# Patient Record
Sex: FEMALE | Race: WHITE | Employment: FULL TIME | URBAN - METROPOLITAN AREA
[De-identification: names, ages, dates, MRNs, and addresses within clinical notes are randomized per-mention and may not be internally consistent; named-entity substitution may affect disease eponyms.]

---

## 2017-06-05 ENCOUNTER — OFFICE VISIT (OUTPATIENT)
Dept: NEUROLOGY | Age: 27
End: 2017-06-05

## 2017-06-05 VITALS
WEIGHT: 129 LBS | OXYGEN SATURATION: 97 % | TEMPERATURE: 98.6 F | BODY MASS INDEX: 24.35 KG/M2 | SYSTOLIC BLOOD PRESSURE: 110 MMHG | HEART RATE: 86 BPM | RESPIRATION RATE: 15 BRPM | DIASTOLIC BLOOD PRESSURE: 64 MMHG | HEIGHT: 61 IN

## 2017-06-05 DIAGNOSIS — G70.00 MYASTHENIA GRAVIS IN REMISSION (HCC): Primary | ICD-10-CM

## 2017-06-05 RX ORDER — ALPRAZOLAM 0.5 MG/1
0.5 TABLET ORAL AS NEEDED
Refills: 1 | COMMUNITY
Start: 2017-04-21

## 2017-06-05 NOTE — MR AVS SNAPSHOT
Visit Information Date & Time Provider Department Dept. Phone Encounter #  
 6/5/2017 11:00 AM Tori Jett MD Neurology [place] [address] [phone] [id number] Follow-up Instructions Return in about 6 months (around 12/5/2017). Your Appointments 6/5/2017 11:00 AM  
New Patient with Tori Jett MD  
Neurology [place] [id number] ([facility]) Appt Note: np myasthenia gravis kna 2.17.17  
 [place] [address] [facility] [address] [facility] [phone] [phone]  
  
   
 [place] [address] [id number] [address] Upcoming Health Maintenance Date Due  
 HPV AGE 9Y-34Y (1 of 3 - Female 3 Dose Series) 8/31/2002 PAP AKA CERVICAL CYTOLOGY 9/3/2015 INFLUENZA AGE 9 TO ADULT 8/1/2017 DTaP/Tdap/Td series (2 - Td) 6/7/2023 Allergies as of 6/5/2017  Review Complete On: 6/5/2017 By: Tori Jett MD  
  
 Severity Noted Reaction Type Reactions Amoxicillin  11/09/2010    Other (comments) Makes skin peel off hands and feet Current Immunizations  Reviewed on 12/7/2011 Name Date Hepatitis B Vaccine 4/9/2002, 11/30/2001, 10/31/2001 Tdap 6/7/2013 Not reviewed this visit Vitals BP Pulse Temp Resp Height(growth percentile) Weight(growth percentile) 110/64 86 98.6 °F (37 °C) 15 5' 1\" (1.549 m) 129 lb (58.5 kg) SpO2 BMI OB Status Smoking Status 97% 24.37 kg/m2 Having regular periods Former Smoker BMI and BSA Data Body Mass Index Body Surface Area  
 24.37 kg/m 2 1.59 m 2 Preferred Pharmacy Pharmacy Name Phone [id number] - [place], [place] - [address] [address] [phone] Your Updated Medication List  
  
   
This list is accurate as of: 6/5/17 10:56 AM.  Always use your most recent med list.  
  
  
  
  
 norethindrone-ethinyl estradiol-iron 1.5 mg-30 mcg (21)/75 mg (7) Tab Commonly known as:  JUNEL FE 1.5/30 (28) Take 1 Tab by mouth daily. Follow-up Instructions Return in about 6 months (around 12/5/2017). Patient Instructions Information Regarding Testing If you have physican order for a test or a medication denied by your insurance company, this does not mean the test or medication is not appropriate for you as that is a medical decision, not a decision to be made by an insurance company representative or by an [facility] physician who has not interviewed and examined you. This is a decision to be made between you and your physician. The denial of services is a contractual matter between you and your insurance company, not an issue between your physician and the insurance company. If your test or medication is denied, you can take the following steps to help resolve the issue: 1. File a complaint with the [facility] of Insurance regarding your insurance company's denial of services ordered for you. You can do this either by calling them directly or by completing an on-line complaint form on the [employer]. This can be found at [email or web address] 2. Also file a formal complaint with your insurance company and ask to have the name of the person denying the service so that you may explore a legal option should you be harmed by this denial of service. Again, the fact the insurance company will not pay for the service does not mean it is not medically necessary and I would encourage you to follow through with the plan that was made with your physician 3. File a written complaint with your employer so your employer and benefit manager is aware of the poor coverage they are providing their employees. If you have medicare/medicaid, complain to your representative in the House and to your Dedrick Recio. PRESCRIPTION REFILL POLICY [facility] Statement to Patients April 1, 2014 In an effort to ensure the large volume of patient prescription refills is processed in the most efficient and expeditious manner, we are asking our patients to assist us by calling your Pharmacy for all prescription refills, this will include also your  Mail Order Pharmacy. The pharmacy will contact our office electronically to continue the refill process. Please do not wait until the last minute to call your pharmacy. We need at least 48 hours (2days) to fill prescriptions. We also encourage you to call your pharmacy before going to  your prescription to make sure it is ready. With regard to controlled substance prescription refill requests (narcotic refills) that need to be picked up at our office, we ask your cooperation by providing us with at least 72 hours (3days) notice that you will need a refill. We will not refill narcotic prescription refill requests after 4:00pm on any weekday, Monday through Thursday, or after 2:00pm on Fridays, or on the weekends. We encourage everyone to explore another way of getting your prescription refill request processed using Vusay, our patient web portal through our electronic medical record system. Vusay is an efficient and effective way to communicate your medication request directly to the office and  downloadable as an unique on your smart phone . Vusay also features a review functionality that allows you to view your medication list as well as leave messages for your physician. Are you ready to get connected? If so please review the attatched instructions or speak to any of our staff to get you set up right away! Thank you so much for your cooperation. Should you have any questions please contact our Practice Administrator. The Physicians and Staff,  [facility] If we have ordered testing for you, we do not call patients with results and we do not give test results over the phone.   We schedule follow up appointments so that your results can be discussed in person and any questions you have regarding them may be addressed. If something of concern is revealed on your test, we will call you for a sooner follow up appointment. Additionally, results may be found by using the My Chart feature and one of our patient service representatives at the  can give you instructions on how to access this feature of our electronic medical record system. [place] [address] What is a living will? A living will is a legal form you use to write down the kind of care you want at the end of your life. It is used by the health professionals who will treat you if you aren't able to decide for yourself. If you put your wishes in writing, your loved ones and others will know what kind of care you want. They won't need to guess. This can ease your mind and be helpful to others. A living will is not the same as an estate or property will. An estate will explains what you want to happen with your money and property after you die. Is a living will a legal document? A living will is a legal document. Each state has its own laws about living tavares. If you move to another state, make sure that your living will is legal in the state where you now live. Or you might use a universal form that has been approved by many states. This kind of form can sometimes be completed and stored online. Your electronic copy will then be available wherever you have a connection to the Internet. In most cases, doctors will respect your wishes even if you have a form from a different state. · You don't need an  to complete a living will. But legal advice can be helpful if your state's laws are unclear, your health history is complicated, or your family can't agree on what should be in your living will. · You can change your living will at any time.  Some people find that their wishes about end-of-life care change as their health changes. · In addition to making a living will, think about completing a medical power of  form. This form lets you name the person you want to make end-of-life treatment decisions for you (your \"health care agent\") if you're not able to. Many hospitals and nursing homes will give you the forms you need to complete a living will and a medical power of . · Your living will is used only if you can't make or communicate decisions for yourself anymore. If you become able to make decisions again, you can accept or refuse any treatment, no matter what you wrote in your living will. · Your state may offer an online registry. This is a place where you can store your living will online so the doctors and nurses who need to treat you can find it right away. What should you think about when creating a living will? Talk about your end-of-life wishes with your family members and your doctor. Let them know what you want. That way the people making decisions for you won't be surprised by your choices. Think about these questions as you make your living will: · Do you know enough about life support methods that might be used? If not, talk to your doctor so you know what might be done if you can't breathe on your own, your heart stops, or you're unable to swallow. · What things would you still want to be able to do after you receive life-support methods? Would you want to be able to walk? To speak? To eat on your own? To live without the help of machines? · If you have a choice, where do you want to be cared for? In your home? At a hospital or nursing home? · Do you want certain Sikhism practices performed if you become very ill? · If you have a choice at the end of your life, where would you prefer to die? At home? In a hospital or nursing home? Somewhere else? · Would you prefer to be buried or cremated? · Do you want your organs to be donated after you die? What should you do with your living will? · Make sure that your family members and your health care agent have copies of your living will. · Give your doctor a copy of your living will to keep in your medical record. If you have more than one doctor, make sure that each one has a copy. · You may want to put a copy of your living will where it can be easily found. Where can you learn more? Go to http://israel-lui.info/. Enter Z152 in the search box to learn more about \"Learning About Living Perrochasity. \" Current as of: February 24, 2016 Content Version: 11.2 © 7445-0867 [employer]. Care instructions adapted under license by [employer] (which disclaims liability or warranty for this information). If you have questions about a medical condition or this instruction, always ask your healthcare professional. [address] any warranty or liability for your use of this information. Introducing [facility]! Janine Cleverly introduces [employer]Culture patient portal. Now you can access parts of your medical record, email your doctor's office, and request medication refills online. 1. In your internet browser, go to https://[employer]. [employer] 2. Click on the First Time User? Click Here link in the Sign In box. You will see the New Member Sign Up page. 3. Enter your SafetyCulture Access Code exactly as it appears below. You will not need to use this code after youve completed the sign-up process. If you do not sign up before the expiration date, you must request a new code. · SafetyCulture Access Code: [email or web address] Expires: 9/3/2017 10:54 AM 
 
4. Enter the last four digits of your Social Security Number (xxxx) and Date of Birth (mm/dd/yyyy) as indicated and click Submit. You will be taken to the next sign-up page. 5. Create a SafetyCulture ID.  This will be your SafetyCulture login ID and cannot be changed, so think of one that is secure and easy to remember. 6. Create a [facility] password. You can change your password at any time. 7. Enter your Password Reset Question and Answer. This can be used at a later time if you forget your password. 8. Enter your e-mail address. You will receive e-mail notification when new information is available in [address] 9. Click Sign Up. You can now view and download portions of your medical record. 10. Click the Download Summary menu link to download a portable copy of your medical information. If you have questions, please visit the Frequently Asked Questions section of the [facility] website. Remember, [facility] is NOT to be used for urgent needs. For medical emergencies, dial 911. Now available from your iPhone and Android! Please provide this summary of care documentation to your next provider. Your primary care clinician is listed as Enriqueta Merrill. If you have any questions after today's visit, please call [phone].

## 2017-06-05 NOTE — PROGRESS NOTES
[address] [facility]   [place] [address]   [place], [address] [place] [address] Drive   [phone] [place]   [phone] Fax             Referring: Regina Donnelly MD      No chief complaint on file. Pleasant 78-year-old right-handed woman who presents today for evaluation of what she calls myasthenia gravis checkup. She tells me she was diagnosed with myasthenia gravis at the age of 14. She says she at that time was having symptoms of buckling knees and she was unable to hold her head upright. She was unable to close her eyes. She had difficulty swallowing and shortness of breath. She said her symptoms went on and were progressive for about a year until she finally was diagnosed. She was diagnosed via blood tests and EMG. [place] [facility] [place] Blanca Langley and then by Dr. Sebastián Torres. She had thymectomy performed in 2007. She was on prednisone for about 3 years and she was on Mestinon as well. She also had plasmapheresis and IVIG. Her last use of IVIG or plasmapheresis was about 8 years ago when she had those and tandem having the pheresis first and then the IVIG. She was tried on CellCept which gave her a rash. She notes that she has been off of her Mestinon for about 3 years now. She says that she was taking it and did not feel any better and she discussed it with her neurologist at the time and he felt that she was likely in remission so the medicine was discontinued. She has been doing well ever since. She has not had any shortness of breath difficulty with speech language or swallowing. She has not had any weakness. No double vision. No ptosis. No falls. Doing very well without symptom. She had been following with  until he moved his practice to [place]. She is wishing to establish care with a neurologist in case something happens. Again no active symptoms.       Past Medical History:   Diagnosis Date    AR (allergic rhinitis) 7/2/2010  Fatigue     Menometrorrhagia 7/2/2010    Muscle weakness     Myasthenia gravis ([facility] [address] 7/2/2010    Recurrent acute otitis media 7/2/2010    Vertigo        Past Surgical History:   Procedure Laterality Date    HX HEENT      age 6-Tympanostomy tubes placement    WI THYMECTOMY,TRANSCERVICAL  12/08    Thymectomy       Current Outpatient Prescriptions   Medication Sig Dispense Refill    norethindrone-ethinyl estradiol-iron (JUNEL FE 1.5/30, 28,) 1.5-30 mg-mcg tablet Take 1 Tab by mouth daily. 3 Packet 4        Allergies   Allergen Reactions    Amoxicillin Other (comments)     Makes skin peel off hands and feet       Social History   Substance Use Topics    Smoking status: Former Smoker     Types: Cigarettes     Quit date: 2/1/2011    Smokeless tobacco: Never Used    Alcohol use 1.2 - 2.4 oz/week     2 - 4 Glasses of wine per week    she never really smoked regularly. She is more of a social smoker. She stopped that in January 2017. She currently works the  at [employer] over at [employer]. Family History   Problem Relation Age of Onset    Other Mother      [place] palsy    Hypertension Father     No Known Problems Sister     Cancer Maternal Aunt     MS Maternal Aunt        Review of Systems  Pertinent positives and negatives as noted above otherwise remainder of comprehensive review is negative    Examination  Visit Vitals    /64    Pulse 86    Temp 98.6 °F (37 °C)    Resp 15    Ht 5' 1\" (1.549 m)    Wt 58.5 kg (129 lb)    SpO2 97%    BMI 24.37 kg/m2     Pleasant, well appearing. No icterus. Oropharynx clear. Supple neck without bruit. Heart regular. No murmur. No edema. Neurologically, she is awake, alert, and oriented with normal speech and language. Her cognition is normal.    Intact cranial nerves 2-12. She has no invokeable diplopia. No nystagmus. Visual fields full to confrontation. Disk margins are flat bilaterally.   She has normal bulk and tone. She has no abnormal movement. She has no pronation or drift. She generates full strength in the upper and lower extremities to direct confrontational testing. Reflexes are symmetrical in the upper and lower extremities bilaterally. Her toes are down bilaterally. No Michelle. Finger nose finger and rapid alternating movements are normal.  Steady gait. No sensory deficit to primary modalities. Impression/Plan  Very pleasant young woman with history of myasthenia gravis now in remission. We discussed this. From my standpoint no need to introduce any medications. Certainly we will be available should she have any issue and we discussed that. For right now we will go ahead and set a 6 month follow-up appointment but certainly if she has issues she will call us. We did discuss warning signs. If she is doing well at 6 months then we will likely relegate her to yearly follow-up. This note was created using voice recognition software. Despite editing, there may be syntax errors. This note will not be viewable in [address]

## 2017-06-05 NOTE — PROGRESS NOTES
When nurse went back in to room to give patient her AVS, patient stated she forget to mention she takes alpra

## 2017-06-05 NOTE — PROGRESS NOTES
New patient presenting with Myasthenia Gravis. Diagnosed 10 years ago. No acute symptoms.  Seeking new neurologist.

## 2017-06-05 NOTE — PATIENT INSTRUCTIONS
Information Regarding Testing     If you have physican order for a test or a medication denied by your insurance company, this does not mean the test or medication is not appropriate for you as that is a medical decision, not a decision to be made by an insurance company representative or by an [facility] physician who has not interviewed and examined you. This is a decision to be made between you and your physician. The denial of services is a contractual matter between you and your insurance company, not an issue between your physician and the insurance company. If your test or medication is denied, you can take the following steps to help resolve the issue:    1. File a complaint with the [facility] regarding your insurance company's denial of services ordered for you. You can do this either by calling them directly or by completing an on-line complaint form on the [employer]. This can be found at www.Entigo    2. Also file a formal complaint with your insurance company and ask to have the name of the person denying the service so that you may explore a legal option should you be harmed by this denial of service. Again, the fact the insurance company will not pay for the service does not mean it is not medically necessary and I would encourage you to follow through with the plan that was made with your physician    3. File a written complaint with your employer so your employer and benefit manager is aware of the poor coverage they are providing their employees. If you have medicare/medicaid, complain to your representative in the House and to your Dedrick Recio.     [facility]   Statement to Patients  April 1, 2014      In an effort to ensure the large volume of patient prescription refills is processed in the most efficient and expeditious manner, we are asking our patients to assist us by calling your Pharmacy for all prescription refills, this will include also your  Mail Order Pharmacy. The pharmacy will contact our office electronically to continue the refill process. Please do not wait until the last minute to call your pharmacy. We need at least 48 hours (2days) to fill prescriptions. We also encourage you to call your pharmacy before going to  your prescription to make sure it is ready. With regard to controlled substance prescription refill requests (narcotic refills) that need to be picked up at our office, we ask your cooperation by providing us with at least 72 hours (3days) notice that you will need a refill. We will not refill narcotic prescription refill requests after 4:00pm on any weekday, Monday through Thursday, or after 2:00pm on Fridays, or on the weekends. We encourage everyone to explore another way of getting your prescription refill request processed using GAMEVIL, our patient web portal through our electronic medical record system. GAMEVIL is an efficient and effective way to communicate your medication request directly to the office and  downloadable as an unique on your smart phone . GAMEVIL also features a review functionality that allows you to view your medication list as well as leave messages for your physician. Are you ready to get connected? If so please review the attatched instructions or speak to any of our staff to get you set up right away! Thank you so much for your cooperation. Should you have any questions please contact our Practice Administrator. The Physicians and Staff,  [facility]       If we have ordered testing for you, we do not call patients with results and we do not give test results over the phone. We schedule follow up appointments so that your results can be discussed in person and any questions you have regarding them may be addressed.   If something of concern is revealed on your test, we will call you for a sooner follow up appointment. Additionally, results may be found by using the My Chart feature and one of our patient service representatives at the  can give you instructions on how to access this feature of our electronic medical record system. Learning About Living Brigida  What is a living will? A living will is a legal form you use to write down the kind of care you want at the end of your life. It is used by the health professionals who will treat you if you aren't able to decide for yourself. If you put your wishes in writing, your loved ones and others will know what kind of care you want. They won't need to guess. This can ease your mind and be helpful to others. A living will is not the same as an estate or property will. An estate will explains what you want to happen with your money and property after you die. Is a living will a legal document? A living will is a legal document. Each state has its own laws about living tavares. If you move to another state, make sure that your living will is legal in the state where you now live. Or you might use a universal form that has been approved by many states. This kind of form can sometimes be completed and stored online. Your electronic copy will then be available wherever you have a connection to the Internet. In most cases, doctors will respect your wishes even if you have a form from a different state. · You don't need an  to complete a living will. But legal advice can be helpful if your state's laws are unclear, your health history is complicated, or your family can't agree on what should be in your living will. · You can change your living will at any time. Some people find that their wishes about end-of-life care change as their health changes. · In addition to making a living will, think about completing a medical power of  form.  This form lets you name the person you want to make end-of-life treatment decisions for you (your \"health care agent\") if you're not able to. Many hospitals and nursing homes will give you the forms you need to complete a living will and a medical power of . · Your living will is used only if you can't make or communicate decisions for yourself anymore. If you become able to make decisions again, you can accept or refuse any treatment, no matter what you wrote in your living will. · Your state may offer an online registry. This is a place where you can store your living will online so the doctors and nurses who need to treat you can find it right away. What should you think about when creating a living will? Talk about your end-of-life wishes with your family members and your doctor. Let them know what you want. That way the people making decisions for you won't be surprised by your choices. Think about these questions as you make your living will:  · Do you know enough about life support methods that might be used? If not, talk to your doctor so you know what might be done if you can't breathe on your own, your heart stops, or you're unable to swallow. · What things would you still want to be able to do after you receive life-support methods? Would you want to be able to walk? To speak? To eat on your own? To live without the help of machines? · If you have a choice, where do you want to be cared for? In your home? At a hospital or nursing home? · Do you want certain Adventism practices performed if you become very ill? · If you have a choice at the end of your life, where would you prefer to die? At home? In a hospital or nursing home? Somewhere else? · Would you prefer to be buried or cremated? · Do you want your organs to be donated after you die? What should you do with your living will? · Make sure that your family members and your health care agent have copies of your living will. · Give your doctor a copy of your living will to keep in your medical record.  If you have more than one doctor, make sure that each one has a copy. · You may want to put a copy of your living will where it can be easily found. Where can you learn more? Go to http://israel-lui.info/. Enter W096 in the search box to learn more about \"Learning About Living Perroy. \"  Current as of: February 24, 2016  Content Version: 11.2  © 4071-6910 [employer]. Care instructions adapted under license by [employer] (which disclaims liability or warranty for this information). If you have questions about a medical condition or this instruction, always ask your healthcare professional. [address] any warranty or liability for your use of this information.

## 2017-06-05 NOTE — PROGRESS NOTES
When nurse went back in to room to give patient her AVS, patient stated she forgot to mention she takes xanax PRN for vertigo.  Medication added to patient list.

## 2018-03-30 ENCOUNTER — OFFICE VISIT (OUTPATIENT)
Dept: NEUROLOGY | Age: 28
End: 2018-03-30

## 2018-03-30 VITALS
DIASTOLIC BLOOD PRESSURE: 62 MMHG | HEIGHT: 61 IN | BODY MASS INDEX: 24.73 KG/M2 | OXYGEN SATURATION: 98 % | WEIGHT: 131 LBS | SYSTOLIC BLOOD PRESSURE: 110 MMHG | HEART RATE: 73 BPM

## 2018-03-30 DIAGNOSIS — G70.00 MYASTHENIA GRAVIS IN REMISSION (HCC): Primary | ICD-10-CM

## 2018-03-30 DIAGNOSIS — G47.33 OBSTRUCTIVE SLEEP APNEA SYNDROME: ICD-10-CM

## 2018-03-30 NOTE — MR AVS SNAPSHOT
[address] [facility], 
[address], [address] [facility] 
[phone] Patient: Diandra Hidalgo MRN: [id number] Kenyatta Iraheta Visit Information Date & Time Provider Department Dept. Phone Encounter #  
 3/30/2018  8:40 AM Lynnette Rahman MD [facility] at [facility] [phone] [id number] Follow-up Instructions Return in about 1 year (around 3/30/2019) for MYASTHENIA. Upcoming Health Maintenance Date Due  
 HPV AGE 9Y-34Y (1 of 3 - Female 3 Dose Series) 8/31/2002 PAP AKA CERVICAL CYTOLOGY 9/3/2015 Influenza Age 5 to Adult 8/1/2017 DTaP/Tdap/Td series (2 - Td) 6/7/2023 Allergies as of 3/30/2018  Review Complete On: 3/30/2018 By: Lynnette Rahman MD  
  
 Severity Noted Reaction Type Reactions Amoxicillin  11/09/2010    Other (comments) Makes skin peel off hands and feet Current Immunizations  Reviewed on 12/7/2011 Name Date Hepatitis B Vaccine 4/9/2002, 11/30/2001, 10/31/2001 Tdap 6/7/2013 Not reviewed this visit You Were Diagnosed With   
  
 Codes Comments Myasthenia gravis in remission ([facility]    -  Primary ICD-10-CM: G70.00 ICD-9-CM: 358.00 Obstructive sleep apnea syndrome     ICD-10-CM: G47.33 
ICD-9-CM: 327.23 Vitals BP Pulse Height(growth percentile) Weight(growth percentile) SpO2 BMI  
 110/62 73 5' 1\" (1.549 m) 131 lb (59.4 kg) 98% 24.75 kg/m2 OB Status Smoking Status Having regular periods Former Smoker Vitals History BMI and BSA Data Body Mass Index Body Surface Area 24.75 kg/m 2 1.6 m 2 Preferred Pharmacy Pharmacy Name Phone [id number] - [place], [place] - [address] [address] [phone] Your Updated Medication List  
  
   
This list is accurate as of 3/30/18  9:17 AM.  Always use your most recent med list.  
  
  
  
  
 ALPRAZolam 0.5 mg tablet Commonly known as:  XANAX  
0.5 mg as needed. norethindrone-ethinyl estradiol-iron 1.5 mg-30 mcg (21)/75 mg (7) Tab Commonly known as:  JUNEL FE 1.5/30 (28) Take 1 Tab by mouth daily. We Performed the Following REFERRAL TO PULMONARY DISEASE [MLF12 Custom] Comments: ADAIR Goldman MD 
[facility] Follow-up Instructions Return in about 1 year (around 3/30/2019) for MYASTHENIA. Referral Information Referral ID Referred By Referred To  
  
 [id number] Seymour Peaemily Not Available Visits Status Start Date End Date 1 New Request 3/30/18 3/30/19 If your referral has a status of pending review or denied, additional information will be sent to support the outcome of this decision. Patient Instructions PRESCRIPTION REFILL POLICY [facility] Statement to Patients April 1, 2014 In an effort to ensure the large volume of patient prescription refills is processed in the most efficient and expeditious manner, we are asking our patients to assist us by calling your Pharmacy for all prescription refills, this will include also your  Mail Order Pharmacy. The pharmacy will contact our office electronically to continue the refill process. Please do not wait until the last minute to call your pharmacy. We need at least 48 hours (2days) to fill prescriptions. We also encourage you to call your pharmacy before going to  your prescription to make sure it is ready. With regard to controlled substance prescription refill requests (narcotic refills) that need to be picked up at our office, we ask your cooperation by providing us with at least 72 hours (3days) notice that you will need a refill. We will not refill narcotic prescription refill requests after 4:00pm on any weekday, Monday through Thursday, or after 2:00pm on Fridays, or on the weekends. We encourage everyone to explore another way of getting your prescription refill request processed using ServerEngines, our patient web portal through our electronic medical record system. [employer]ines is an efficient and effective way to communicate your medication request directly to the office and  downloadable as an unique on your smart phone . [employer] also features a review functionality that allows you to view your medication list as well as leave messages for your physician. Are you ready to get connected? If so please review the attatched instructions or speak to any of our staff to get you set up right away! Thank you so much for your cooperation. Should you have any questions please contact our Practice Administrator. The Physicians and Staff,  [facility] Please be advised there is a $25 fee for all paperwork to be completed from our  providers. This is to be paid by the patient prior to picking up the completed forms. A Healthy Lifestyle: Care Instructions Your Care Instructions A healthy lifestyle can help you feel good, stay at a healthy weight, and have plenty of energy for both work and play. A healthy lifestyle is something you can share with your whole family. A healthy lifestyle also can lower your risk for serious health problems, such as high blood pressure, heart disease, and diabetes. You can follow a few steps listed below to improve your health and the health of your family. Follow-up care is a key part of your treatment and safety. Be sure to make and go to all appointments, and call your doctor if you are having problems. It's also a good idea to know your test results and keep a list of the medicines you take. How can you care for yourself at home? · Do not eat too much sugar, fat, or fast foods. You can still have dessert and treats now and then. The goal is moderation. · Start small to improve your eating habits.  Pay attention to portion sizes, drink less juice and soda pop, and eat more fruits and vegetables. ¨ Eat a healthy amount of food. A 3-ounce serving of meat, for example, is about the size of a deck of cards. Fill the rest of your plate with vegetables and whole grains. ¨ Limit the amount of soda and sports drinks you have every day. Drink more water when you are thirsty. ¨ Eat at least 5 servings of fruits and vegetables every day. It may seem like a lot, but it is not hard to reach this goal. A serving or helping is 1 piece of fruit, 1 cup of vegetables, or 2 cups of leafy, raw vegetables. Have an apple or some carrot sticks as an afternoon snack instead of a candy bar. Try to have fruits and/or vegetables at every meal. 
· Make exercise part of your daily routine. You may want to start with simple activities, such as walking, bicycling, or slow swimming. Try to be active 30 to 60 minutes every day. You do not need to do all 30 to 60 minutes all at once. For example, you can exercise 3 times a day for 10 or 20 minutes. Moderate exercise is safe for most people, but it is always a good idea to talk to your doctor before starting an exercise program. 
· Keep moving. Shira Martha the lawn, work in the garden, or [employer]. Take the stairs instead of the elevator at work. · If you smoke, quit. People who smoke have an increased risk for heart attack, stroke, cancer, and other lung illnesses. Quitting is hard, but there are ways to boost your chance of quitting tobacco for good. ¨ Use nicotine gum, patches, or lozenges. ¨ Ask your doctor about stop-smoking programs and medicines. ¨ Keep trying. In addition to reducing your risk of diseases in the future, you will notice some benefits soon after you stop using tobacco. If you have shortness of breath or asthma symptoms, they will likely get better within a few weeks after you quit. · Limit how much alcohol you drink.  Moderate amounts of alcohol (up to 2 drinks a day for men, 1 drink a day for women) are okay. But drinking too much can lead to liver problems, high blood pressure, and other health problems. Family health If you have a family, there are many things you can do together to improve your health. · Eat meals together as a family as often as possible. · Eat healthy foods. This includes fruits, vegetables, lean meats and dairy, and whole grains. · Include your family in your fitness plan. Most people think of activities such as jogging or tennis as the way to fitness, but there are many ways you and your family can be more active. Anything that makes you breathe hard and gets your heart pumping is exercise. Here are some tips: 
¨ Walk to do errands or to take your child to school or the bus. ¨ Go for a family bike ride after dinner instead of watching TV. Where can you learn more? Go to http://israel-lui.info/. Enter R522 in the search box to learn more about \"A Healthy Lifestyle: Care Instructions. \" Current as of: May 12, 2017 Content Version: 11.4 © 8591-8991 [employer]. Care instructions adapted under license by [employer] (which disclaims liability or warranty for this information). If you have questions about a medical condition or this instruction, always ask your healthcare professional. [address] any warranty or liability for your use of this information. Introducing [facility]! Tali Jarquin introduces Swift Identity patient portal. Now you can access parts of your medical record, email your doctor's office, and request medication refills online. 1. In your internet browser, go to [email or web address] 2. Click on the First Time User? Click Here link in the Sign In box. You will see the New Member Sign Up page. 3. Enter your Swift Identity Access Code exactly as it appears below.  You will not need to use this code after youve completed the sign-up process. If you do not sign up before the expiration date, you must request a new code. · cafegive Access Code: [email or web address] Expires: 6/28/2018  8:38 AM 
 
4. Enter the last four digits of your Social Security Number (xxxx) and Date of Birth (mm/dd/yyyy) as indicated and click Submit. You will be taken to the next sign-up page. 5. Create a cafegive ID. This will be your cafegive login ID and cannot be changed, so think of one that is secure and easy to remember. 6. Create a cafegive password. You can change your password at any time. 7. Enter your Password Reset Question and Answer. This can be used at a later time if you forget your password. 8. Enter your e-mail address. You will receive e-mail notification when new information is available in [address]. 9. Click Sign Up. You can now view and download portions of your medical record. 10. Click the Download Summary menu link to download a portable copy of your medical information. If you have questions, please visit the Frequently Asked Questions section of the cafegive website. Remember, cafegive is NOT to be used for urgent needs. For medical emergencies, dial 911. Now available from your iPhone and Android! Please provide this summary of care documentation to your next provider. Your primary care clinician is listed as Maritza Bullard. If you have any questions after today's visit, please call [phone].

## 2018-03-30 NOTE — PROGRESS NOTES
Name: Raysa Reed    Chief Complaint: Myasthenia gravis    Returns for folow up. Seen by Dr. Susan Castro in June of last year. Diagnosed at age 14 with myasthenia. Presenting symptoms were difficulty swallowing and neck extensor weakness. [place] [facility] [place] Dr. Jesse Kwan and finally Dr. Nohemi Valdez. She was diagnosed with granuloma annulare    Medications  Current Outpatient Prescriptions   Medication Sig    ALPRAZolam (XANAX) 0.5 mg tablet 0.5 mg as needed.  norethindrone-ethinyl estradiol-iron (JUNEL FE 1.5/30, 28,) 1.5-30 mg-mcg tablet Take 1 Tab by mouth daily. No current facility-administered medications for this visit. Medical History  Past Medical History:   Diagnosis Date    AR (allergic rhinitis) 7/2/2010    Fatigue     Menometrorrhagia 7/2/2010    Muscle weakness     Myasthenia gravis ([address] 7/2/2010    Recurrent acute otitis media 7/2/2010    Vertigo      Review of Systems   Constitutional: Negative for chills and fever. HENT: Negative for ear pain. Eyes: Negative for pain and discharge. Respiratory: Negative for cough and hemoptysis. Cardiovascular: Negative for chest pain and claudication. Gastrointestinal: Negative for constipation and diarrhea. Genitourinary: Negative for flank pain and hematuria. Musculoskeletal: Negative for back pain and myalgias. Skin: Negative for itching and rash. Neurological: Negative for headaches. Endo/Heme/Allergies: Negative for environmental allergies. Does not bruise/bleed easily. Psychiatric/Behavioral: Negative for depression and hallucinations. Exam:    Visit Vitals    /62    Pulse 73    Ht 5' 1\" (1.549 m)    Wt 131 lb (59.4 kg)    SpO2 98%    BMI 24.75 kg/m2      General: Well developed, well nourished.  Patient in no apparent distress   Head: Normocephalic, atraumatic, anicteric sclera   Neck Normal ROM, No thyromegally   Lungs:  Clear to auscultation bilaterally, No wheezes or rubs Cardiac: Regular rate and rhythm with no murmurs. Abd: Bowel sounds were audible. No tenderness on palpation   Ext: No pedal edema   Skin: Supple no granuloma annulare shins and abdomen     NeurologicExam:  Mental Status: Alert and oriented to person place and time   Speech: Fluent no aphasia or dysarthria. Cranial Nerves:  II - XII Intact maybe weak left orbicularis   Motor:  Full and symmetric strength of upper and lower proximal and distal muscles. Normal bulk and tone. Reflexes:   Deep tendon reflexes 2+/4 and symmetric. Sensory:   Symmetric and intact with no perceived deficits modalities involving small or large fibers. Gait:  Gait is balanced and fluid with normal arm swing. Tremor:   No tremor noted. Cerebellar:  Coordination intact. Neurovascular: No carotid bruits.  No JVD           Imaging    Lab Review    Lab Results   Component Value Date/Time    WBC 6.9 05/17/2016 03:36 PM    HCT 41.4 05/17/2016 03:36 PM    HGB 14.0 05/17/2016 03:36 PM    PLATELET 709 43/09/6298 03:36 PM       Lab Results   Component Value Date/Time    Sodium 138 05/17/2016 03:36 PM    Potassium 3.8 05/17/2016 03:36 PM    Chloride 100 05/17/2016 03:36 PM    CO2 21 05/17/2016 03:36 PM    Glucose 96 05/17/2016 03:36 PM    BUN 8 05/17/2016 03:36 PM    Creatinine 0.61 05/17/2016 03:36 PM    Calcium 9.2 05/17/2016 03:36 PM

## 2018-03-30 NOTE — PATIENT INSTRUCTIONS
[facility]   Statement to Patients  April 1, 2014      In an effort to ensure the large volume of patient prescription refills is processed in the most efficient and expeditious manner, we are asking our patients to assist us by calling your Pharmacy for all prescription refills, this will include also your  Mail Order Pharmacy. The pharmacy will contact our office electronically to continue the refill process. Please do not wait until the last minute to call your pharmacy. We need at least 48 hours (2days) to fill prescriptions. We also encourage you to call your pharmacy before going to  your prescription to make sure it is ready. With regard to controlled substance prescription refill requests (narcotic refills) that need to be picked up at our office, we ask your cooperation by providing us with at least 72 hours (3days) notice that you will need a refill. We will not refill narcotic prescription refill requests after 4:00pm on any weekday, Monday through Thursday, or after 2:00pm on Fridays, or on the weekends. We encourage everyone to explore another way of getting your prescription refill request processed using Nautal, our patient web portal through our electronic medical record system. Nautal is an efficient and effective way to communicate your medication request directly to the office and  downloadable as an unique on your smart phone . Nautal also features a review functionality that allows you to view your medication list as well as leave messages for your physician. Are you ready to get connected? If so please review the attatched instructions or speak to any of our staff to get you set up right away! Thank you so much for your cooperation. Should you have any questions please contact our Practice Administrator.     The Physicians and Staff,  [facility]     Please be advised there is a $25 fee for all paperwork to be completed from our  providers. This is to be paid by the patient prior to picking up the completed forms. A Healthy Lifestyle: Care Instructions  Your Care Instructions    A healthy lifestyle can help you feel good, stay at a healthy weight, and have plenty of energy for both work and play. A healthy lifestyle is something you can share with your whole family. A healthy lifestyle also can lower your risk for serious health problems, such as high blood pressure, heart disease, and diabetes. You can follow a few steps listed below to improve your health and the health of your family. Follow-up care is a key part of your treatment and safety. Be sure to make and go to all appointments, and call your doctor if you are having problems. It's also a good idea to know your test results and keep a list of the medicines you take. How can you care for yourself at home? · Do not eat too much sugar, fat, or fast foods. You can still have dessert and treats now and then. The goal is moderation. · Start small to improve your eating habits. Pay attention to portion sizes, drink less juice and soda pop, and eat more fruits and vegetables. ¨ Eat a healthy amount of food. A 3-ounce serving of meat, for example, is about the size of a deck of cards. Fill the rest of your plate with vegetables and whole grains. ¨ Limit the amount of soda and sports drinks you have every day. Drink more water when you are thirsty. ¨ Eat at least 5 servings of fruits and vegetables every day. It may seem like a lot, but it is not hard to reach this goal. A serving or helping is 1 piece of fruit, 1 cup of vegetables, or 2 cups of leafy, raw vegetables. Have an apple or some carrot sticks as an afternoon snack instead of a candy bar. Try to have fruits and/or vegetables at every meal.  · Make exercise part of your daily routine. You may want to start with simple activities, such as walking, bicycling, or slow swimming.  Try to be active 30 to 60 minutes every day. You do not need to do all 30 to 60 minutes all at once. For example, you can exercise 3 times a day for 10 or 20 minutes. Moderate exercise is safe for most people, but it is always a good idea to talk to your doctor before starting an exercise program.  · Keep moving. [place] the lawn, work in the garden, or [employer]. Take the stairs instead of the elevator at work. · If you smoke, quit. People who smoke have an increased risk for heart attack, stroke, cancer, and other lung illnesses. Quitting is hard, but there are ways to boost your chance of quitting tobacco for good. ¨ Use nicotine gum, patches, or lozenges. ¨ Ask your doctor about stop-smoking programs and medicines. ¨ Keep trying. In addition to reducing your risk of diseases in the future, you will notice some benefits soon after you stop using tobacco. If you have shortness of breath or asthma symptoms, they will likely get better within a few weeks after you quit. · Limit how much alcohol you drink. Moderate amounts of alcohol (up to 2 drinks a day for men, 1 drink a day for women) are okay. But drinking too much can lead to liver problems, high blood pressure, and other health problems. Family health  If you have a family, there are many things you can do together to improve your health. · Eat meals together as a family as often as possible. · Eat healthy foods. This includes fruits, vegetables, lean meats and dairy, and whole grains. · Include your family in your fitness plan. Most people think of activities such as jogging or tennis as the way to fitness, but there are many ways you and your family can be more active. Anything that makes you breathe hard and gets your heart pumping is exercise. Here are some tips:  ¨ Walk to do errands or to take your child to school or the bus. ¨ Go for a family bike ride after dinner instead of watching TV. Where can you learn more?   Go to http://israel-lui.info/. Enter N009 in the search box to learn more about \"A Healthy Lifestyle: Care Instructions. \"  Current as of: May 12, 2017  Content Version: 11.4  © 0762-0155 [employer], [employer]. Care instructions adapted under license by [employer] (which disclaims liability or warranty for this information). If you have questions about a medical condition or this instruction, always ask your healthcare professional. [address] any warranty or liability for your use of this information.

## 2019-03-29 ENCOUNTER — OFFICE VISIT (OUTPATIENT)
Dept: NEUROLOGY | Age: 29
End: 2019-03-29

## 2019-03-29 VITALS
HEART RATE: 74 BPM | HEIGHT: 61 IN | DIASTOLIC BLOOD PRESSURE: 64 MMHG | BODY MASS INDEX: 26.43 KG/M2 | SYSTOLIC BLOOD PRESSURE: 100 MMHG | OXYGEN SATURATION: 96 % | WEIGHT: 140 LBS

## 2019-03-29 DIAGNOSIS — G70.00 MYASTHENIA GRAVIS IN REMISSION (HCC): Primary | ICD-10-CM

## 2019-03-29 NOTE — PATIENT INSTRUCTIONS
Office Policies      Paperwork            Any paperwork that needs to be completed has a 3 WEEK turnaround time. Phone calls/patient messages:    · Please allow up to 24 hours for someone in the office to contact you about your call or message. Be mindful your provider may be out of the office or your message may require further review. We encourage you to use ACE[employer]COMM for your messages as this is a faster, more efficient way to communicate with our office           Medication Refills:    · Prescription medications require up to 48 business hours to process. We encourage you to use [employer]COMM for your refills. · For controlled medications: Please allow up to 72 business hours to process. Certain medications may require you to  a written prescription at our office. · NO narcotic/controlled medications will be prescribed after 4pm Monday through Friday or on weekends                     A Healthy Lifestyle: Care Instructions  Your Care Instructions    A healthy lifestyle can help you feel good, stay at a healthy weight, and have plenty of energy for both work and play. A healthy lifestyle is something you can share with your whole family. A healthy lifestyle also can lower your risk for serious health problems, such as high blood pressure, heart disease, and diabetes. You can follow a few steps listed below to improve your health and the health of your family. Follow-up care is a key part of your treatment and safety. Be sure to make and go to all appointments, and call your doctor if you are having problems. It's also a good idea to know your test results and keep a list of the medicines you take. How can you care for yourself at home? · Do not eat too much sugar, fat, or fast foods. You can still have dessert and treats now and then. The goal is moderation. · Start small to improve your eating habits.  Pay attention to portion sizes, drink less juice and soda pop, and eat more fruits and vegetables. ? Eat a healthy amount of food. A 3-ounce serving of meat, for example, is about the size of a deck of cards. Fill the rest of your plate with vegetables and whole grains. ? Limit the amount of soda and sports drinks you have every day. Drink more water when you are thirsty. ? Eat at least 5 servings of fruits and vegetables every day. It may seem like a lot, but it is not hard to reach this goal. A serving or helping is 1 piece of fruit, 1 cup of vegetables, or 2 cups of leafy, raw vegetables. Have an apple or some carrot sticks as an afternoon snack instead of a candy bar. Try to have fruits and/or vegetables at every meal.  · Make exercise part of your daily routine. You may want to start with simple activities, such as walking, bicycling, or slow swimming. Try to be active 30 to 60 minutes every day. You do not need to do all 30 to 60 minutes all at once. For example, you can exercise 3 times a day for 10 or 20 minutes. Moderate exercise is safe for most people, but it is always a good idea to talk to your doctor before starting an exercise program.  · Keep moving. [place] the lawn, work in the garden, or [place]. Take the stairs instead of the elevator at work. · If you smoke, quit. People who smoke have an increased risk for heart attack, stroke, cancer, and other lung illnesses. Quitting is hard, but there are ways to boost your chance of quitting tobacco for good. ? Use nicotine gum, patches, or lozenges. ? Ask your doctor about stop-smoking programs and medicines. ? Keep trying. In addition to reducing your risk of diseases in the future, you will notice some benefits soon after you stop using tobacco. If you have shortness of breath or asthma symptoms, they will likely get better within a few weeks after you quit. · Limit how much alcohol you drink. Moderate amounts of alcohol (up to 2 drinks a day for men, 1 drink a day for women) are okay.  But drinking too much can lead to liver problems, high blood pressure, and other health problems. Family health  If you have a family, there are many things you can do together to improve your health. · Eat meals together as a family as often as possible. · Eat healthy foods. This includes fruits, vegetables, lean meats and dairy, and whole grains. · Include your family in your fitness plan. Most people think of activities such as jogging or tennis as the way to fitness, but there are many ways you and your family can be more active. Anything that makes you breathe hard and gets your heart pumping is exercise. Here are some tips:  ? Walk to do errands or to take your child to school or the bus.  ? Go for a family bike ride after dinner instead of watching TV. Where can you learn more? Go to http://israel-lui.info/. Enter X324 in the search box to learn more about \"A Healthy Lifestyle: Care Instructions. \"  Current as of: September 11, 2018  Content Version: 11.9  © 5594-8238 [employer]. Care instructions adapted under license by [employer] (which disclaims liability or warranty for this information). If you have questions about a medical condition or this instruction, always ask your healthcare professional. [place] [address] any warranty or liability for your use of this information.

## 2019-03-29 NOTE — PROGRESS NOTES
Name: Chelly Reddy    Chief Complaint: Myasthenia gravis    Returns for follow up. No weakness, sensory loss dysphagia or difficulty chewing. No shortness of breath. Assessment  1. Myasthenia  In remission x 3 years  S/p thymectomy  followed in the past by Dr. Ariadna Zarate  Will follow for two more years          Medications  Current Outpatient Medications   Medication Sig    ALPRAZolam (XANAX) 0.5 mg tablet 0.5 mg as needed.  norethindrone-ethinyl estradiol-iron (JUNEL FE 1.5/30, 28,) 1.5-30 mg-mcg tablet Take 1 Tab by mouth daily. No current facility-administered medications for this visit. Medical History  Past Medical History:   Diagnosis Date    AR (allergic rhinitis) 7/2/2010    Fatigue     Menometrorrhagia 7/2/2010    Muscle weakness     Myasthenia gravis ([address] 7/2/2010    Recurrent acute otitis media 7/2/2010    Vertigo      Review of Systems   Constitutional: Negative for chills and fever. HENT: Negative for ear pain. Eyes: Negative for pain and discharge. Respiratory: Negative for cough and hemoptysis. Cardiovascular: Negative for chest pain and claudication. Gastrointestinal: Negative for constipation and diarrhea. Genitourinary: Negative for flank pain and hematuria. Musculoskeletal: Negative for back pain and myalgias. Skin: Negative for itching and rash. Neurological: Negative for headaches. Endo/Heme/Allergies: Negative for environmental allergies. Does not bruise/bleed easily. Psychiatric/Behavioral: Negative for depression and hallucinations. Exam:    Visit Vitals  /64   Pulse 74   Ht 5' 1\" (1.549 m)   Wt 140 lb (63.5 kg)   SpO2 96%   BMI 26.45 kg/m²      General: Well developed, well nourished.  Patient in no apparent distress   Head: Normocephalic, atraumatic, anicteric sclera   Neck Normal ROM, No thyromegally   Cardiac: Regular rate and rhythm   Ext: No pedal edema   Skin: Supple no rash     NeurologicExam:  Mental Status: Alert and oriented to person place and time   Speech: Fluent no aphasia or dysarthria. Cranial Nerves:  II - XII Intact specifically no ptosis or double vision   Motor:  Full and symmetric strength of upper and lower proximal and distal muscles. Normal bulk and tone. Sensory:   Symmetric and intact with no perceived deficits modalities involving small or large fibers. Gait:  Gait is balanced and fluid with normal arm swing. Tremor:   No tremor noted. Cerebellar:  Coordination intact.               Imaging    Lab Review    Lab Results   Component Value Date/Time    WBC 6.9 05/17/2016 03:36 PM    HCT 41.4 05/17/2016 03:36 PM    HGB 14.0 05/17/2016 03:36 PM    PLATELET 093 26/88/2076 03:36 PM       Lab Results   Component Value Date/Time    Sodium 138 05/17/2016 03:36 PM    Potassium 3.8 05/17/2016 03:36 PM    Chloride 100 05/17/2016 03:36 PM    CO2 21 05/17/2016 03:36 PM    Glucose 96 05/17/2016 03:36 PM    BUN 8 05/17/2016 03:36 PM    Creatinine 0.61 05/17/2016 03:36 PM    Calcium 9.2 05/17/2016 03:36 PM

## 2021-03-03 ENCOUNTER — OFFICE VISIT (OUTPATIENT)
Dept: NEUROLOGY | Age: 31
End: 2021-03-03
Payer: COMMERCIAL

## 2021-03-03 VITALS
RESPIRATION RATE: 18 BRPM | HEART RATE: 76 BPM | SYSTOLIC BLOOD PRESSURE: 90 MMHG | DIASTOLIC BLOOD PRESSURE: 62 MMHG | WEIGHT: 145 LBS | HEIGHT: 62 IN | OXYGEN SATURATION: 99 % | TEMPERATURE: 99.1 F | BODY MASS INDEX: 26.68 KG/M2

## 2021-03-03 DIAGNOSIS — R00.2 PALPITATION: ICD-10-CM

## 2021-03-03 DIAGNOSIS — G70.00 MYASTHENIA GRAVIS (HCC): Primary | ICD-10-CM

## 2021-03-03 PROCEDURE — 99214 OFFICE O/P EST MOD 30 MIN: CPT | Performed by: PSYCHIATRY & NEUROLOGY

## 2021-03-03 NOTE — PROGRESS NOTES
Name: Cl Harry    Chief Complaint: Myasthenia gravis    Returns for follow up. No weakness, sensory loss dysphagia or difficulty chewing. No shortness of breath. February 15th woke up with an irregular heart beat she went to [facility] and had the bubble test. She was told that the right side of her heart was hypokinetic She was not referred to a cardiol      Assessment  1. Myasthenia  In remission x 3 years   S/p thymectomy  followed in the past by Dr. Laura Duarte  Will refer back to Dr. Cathi Bernal    2. Palpitations   Will refer to cardiology    3. Anxiety continue alprazolam.        Medications  Current Outpatient Medications   Medication Sig    ALPRAZolam (XANAX) 0.5 mg tablet 0.5 mg as needed.  norethindrone-ethinyl estradiol-iron (JUNEL FE 1.5/30, 28,) 1.5-30 mg-mcg tablet Take 1 Tab by mouth daily. No current facility-administered medications for this visit. Medical History  Past Medical History:   Diagnosis Date    AR (allergic rhinitis) 7/2/2010    Fatigue     Menometrorrhagia 7/2/2010    Muscle weakness     Myasthenia gravis ([facility] [address] 7/2/2010    Recurrent acute otitis media 7/2/2010    Vertigo      Review of Systems   Constitutional: Negative for chills and fever. HENT: Negative for ear pain. Eyes: Negative for pain and discharge. Respiratory: Negative for cough and hemoptysis. Cardiovascular: Positive for palpitations. Negative for chest pain and claudication. Gastrointestinal: Negative for constipation and diarrhea. Genitourinary: Negative for flank pain and hematuria. Musculoskeletal: Negative for back pain and myalgias. Skin: Negative for itching and rash. Neurological: Negative for headaches. Endo/Heme/Allergies: Negative for environmental allergies. Does not bruise/bleed easily. Psychiatric/Behavioral: Negative for depression and hallucinations.        Exam:    Visit Vitals  BP 90/62 (BP 1 Location: Left upper arm, BP Patient Position: Sitting, BP Cuff Size: Adult)   Pulse 76   Temp 99.1 °F (37.3 °C) (Oral)   Resp 18   Ht 5' 2\" (1.575 m)   Wt 145 lb (65.8 kg)   LMP 02/17/2021   SpO2 99%   BMI 26.52 kg/m²      General: Well developed, well nourished. Patient in no apparent distress   Head: Normocephalic, atraumatic, anicteric sclera   Neck Normal ROM, No thyromegally   Cardiac: Regular rate and irregular rhythm   Ext: No pedal edema   Skin: Supple no rash     NeurologicExam:  Mental Status: Alert and oriented to person place and time   Speech: Fluent no aphasia or dysarthria. Cranial Nerves:  II - XII Intact specifically no ptosis or double vision   Motor:  Full and symmetric strength of upper and lower proximal and distal muscles. Normal bulk and tone. Sensory:   Symmetric and intact with no perceived deficits modalities involving small or large fibers. Gait:  Gait is balanced and fluid with normal arm swing. Tremor:   No tremor noted. Cerebellar:  Coordination intact.               Imaging    Lab Review    Lab Results   Component Value Date/Time    WBC 6.9 05/17/2016 03:36 PM    HCT 41.4 05/17/2016 03:36 PM    HGB 14.0 05/17/2016 03:36 PM    PLATELET 975 28/42/0042 03:36 PM       Lab Results   Component Value Date/Time    Sodium 138 05/17/2016 03:36 PM    Potassium 3.8 05/17/2016 03:36 PM    Chloride 100 05/17/2016 03:36 PM    CO2 21 05/17/2016 03:36 PM    Glucose 96 05/17/2016 03:36 PM    BUN 8 05/17/2016 03:36 PM    Creatinine 0.61 05/17/2016 03:36 PM    Calcium 9.2 05/17/2016 03:36 PM

## 2021-03-03 NOTE — PROGRESS NOTES
Jesse Jessica is a 35 y.o. female  HIPAA verified by two patient identifiers. Health Maintenance Due   Topic    Hepatitis C Screening     PAP AKA CERVICAL CYTOLOGY     Flu Vaccine (1)     Chief Complaint   Patient presents with    Follow-up     1 year remission     Visit Vitals  BP 90/62 (BP 1 Location: Left upper arm, BP Patient Position: Sitting, BP Cuff Size: Adult)   Pulse 76   Temp 99.1 °F (37.3 °C) (Oral)   Resp 18   Ht 5' 2\" (1.575 m)   Wt 145 lb (65.8 kg)   LMP 02/17/2021   SpO2 99%   BMI 26.52 kg/m²       Pain Scale: 0 - No pain/10  Pain Location:   1. Have you been to the ER, urgent care clinic since your last visit? Hospitalized since your last visit? No    2. Have you seen or consulted any other health care providers outside of the [address] [place] [address] since your last visit? Include any pap smears or colon screening.  No

## 2022-03-20 PROBLEM — G70.00 MYASTHENIA GRAVIS IN REMISSION (HCC): Status: ACTIVE | Noted: 2017-06-05

## 2022-09-29 NOTE — PROGRESS NOTES
Neurology Note    Patient ID:  Alma Bailey  [id number]  28 y.o.  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 She complains of intermittent bouts of vertigo as well as increasing stress and anxiety. Seropositive myasthenia gravis status post thymectomy:  She has been able to maintain off of immunosuppressive medications  Currently, there are no ongoing symptoms of myasthenia gravis on her examination today. I did discuss with her however that once a myasthenic/always a myasthenic. I did discuss with her the [employer] unique which has very useful information in regards to disease process. I also did review with her medications to avoid and if she is uncertain if the medication is safe to take or not, to contact her pharmacist or call the neurology clinic. Intermittent bouts of vertigo: These have been associated with stress/panic:  Her neurological examination was normal today. She will continue to follow with ENT. I did ask her to schedule an appointment with a primary care doctor to review other possible options to help with stress, anxiety, panic attacks. I did give her a list of counselors/psychologist to call for considerations of cognitive therapy. Currently she uses xanax as needed. Subjective: I have myasthenia gravis intermittent vertigo       History of Present Illness:   Alma Bailey is a 33 y.o. female who returns to the neurology clinic at [facility] for an evaluation. The patient was last seen approximately 18 months ago by a former neurologist for her myasthenia gravis. She was status post a thymectomy. It appears she has been seen by other neurologist as well. From reviewing the medical records, the patient has had myasthenia gravis for many years.   She had been hospitalized in 2010 and did receive plasmapheresis. She had also previously been on prednisone, CellCept, and pyridostigmine. This is my first encounter with the patient at [facility]. She states she has been off immunomodulating treatment for close to 10 years. She felt that her myasthenia gravis has been in remission. She was a antibody positive status post thymectomy myasthenia gravis patient. She has not had any episodes of double vision or weakness. After her recent move she did feel heavy and tired but she attributed that to the exhaustion of the move. Her primary concern today is intermittent vertigo. She is followed with ENT for this. There is no hearing loss. It has been determined that this was stress-induced vertigo. She gets quite anxious and all of a sudden the room begins to spin around her. She feels like she is going to fall. She tries to calm her self down. Sometimes this does develop and no full panic attack. She does have a prescription of Xanax. She used to take this daily but now uses this intermittently which seems to help her symptoms. She has noticed new episodes when she is tilting her head back in the shower that she can get these episodes of feeling like the room is spinning and being off balance which can last up to 1 minute. She states she stays well-hydrated. She gets adequate sleep. She does have other episodes of stress and anxiety. She has noticed that at time she does not want to go outside due to her anxiety.     She states that she does not have a primary care doctor but recognizes she should get one    Past Medical History:   Diagnosis Date    AR (allergic rhinitis) 7/2/2010    Fatigue     Menometrorrhagia 7/2/2010    Muscle weakness     Myasthenia gravis ([address] 7/2/2010    Recurrent acute otitis media 7/2/2010    Vertigo         Past Surgical History:   Procedure Laterality Date    HX HEENT      age 6-Tympanostomy tubes placement    DC THYMECTOMY,TRANSCERVICAL      Thymectomy        Family History   Problem Relation Age of Onset    Cancer Mother     Other Mother         Jasper palsy    Hypertension Father     No Known Problems Sister     Cancer Maternal Aunt     Mult Sclerosis Maternal Aunt         Social History     Tobacco Use    Smoking status: Former     Types: Cigarettes     Quit date: 2011     Years since quittin.6    Smokeless tobacco: Never   Substance Use Topics    Alcohol use: Yes     Alcohol/week: 2.0 - 4.0 standard drinks     Types: 2 - 4 Glasses of wine per week        Allergies   Allergen Reactions    Amoxicillin Other (comments)     Makes skin peel off hands and feet        Prior to Admission medications    Medication Sig Start Date End Date Taking? Authorizing Provider   ALPRAZowill [place]) 0.5 mg tablet 0.5 mg as needed. 17  Yes Provider, Historical   norethindrone-ethinyl estradiol-iron (JUNEL FE 1.,) 1.5-30 mg-mcg tablet Take 1 Tab by mouth daily.  11  Yes Cosme Ashley MD       Review of Systems:    General, constitutional: stress/anxiety  Eyes, vision: negative  Ears, nose, throat: negative  Cardiovascular, heart: negative  Respiratory: negative  Gastrointestinal: negative  Genitourinary: negative  Musculoskeletal: negative  Skin and integumentary: negative  Psychiatric: negative  Endocrine: negative  Neurological: negative, except for HPI  Hematologic/lymphatic: negative  Allergy/immunology: negative      Objective:     Visit Vitals  /60 (BP 1 Location: Left arm, BP Patient Position: Sitting, BP Cuff Size: Adult)   Resp 16   Ht 5' 2\" (1.575 m)   Wt 151 lb 3.2 oz (68.6 kg)   BMI 27.65 kg/m²       Physical Exam:    General:  appears well nourished in no acute distress  Neck: no carotid bruits or vertebral bruits  Lungs: clear to auscultation  Heart:  no murmurs, regular rate  Lower extremity: peripheral pulses palpable and no edema  Skin: intact    Neurological exam:    Awake, alert, oriented to person, place and time  Recent and remote memory were normal  Attention and concentration were intact  Language was intact. There was no aphasia  Speech: no dysarthria  Fund of knowledge was preserved    Cranial nerves:   II-XII were tested    [employer] fields were full  Eomi, no evidence of nystagmus  Facial sensation:  normal and symmetric  Facial motor: normal and symmetric  Hearing intact  SCM strength intact  Tongue: midline without fasciculations  There is no ptosis at baseline. There was no fatigable ptosis  Motor: Tone normal    No evidence of fasciculations    Strength testing:   deltoid triceps biceps Wrist ext. Wrist flex. intrinsics Hip flex. Hip ext. Knee ext. Knee flex Dorsi flex Plantar flex   Right 5 5 5 5 5 5 5 5 5 5 5 5   Left 5 5 5 5 5 5 5 5 5 5 5 5     There is no fatigability in repetitive muscle strength testing of her upper extremities. Sensory:  Upper extremity: intact to pp, light touch, and vibration > 10 seconds  Lower extremity: intact to pp, light touch, and vibration > 10 seconds    Reflexes:    Right Left  Biceps  2 2  Triceps 2 2  Brachiorad. 2 2  Patella  2 2  Achilles 2 2    Plantar response:  flexor bilaterally    Cerebellar testing:  no tremor apparent, finger/nose and dacia were intact    Romberg: absent    Gait: steady. Heel, toe, and tandem gait were normal    Labs:     Lab Results   Component Value Date/Time    Sodium 138 05/17/2016 03:36 PM    Potassium 3.8 05/17/2016 03:36 PM    Chloride 100 05/17/2016 03:36 PM    Glucose 96 05/17/2016 03:36 PM    BUN 8 05/17/2016 03:36 PM    Creatinine 0.61 05/17/2016 03:36 PM    Calcium 9.2 05/17/2016 03:36 PM    WBC 6.9 05/17/2016 03:36 PM    HCT 41.4 05/17/2016 03:36 PM    HGB 14.0 05/17/2016 03:36 PM    PLATELET 714 16/84/5262 03:36 PM       Imaging:    No results found for this or any previous visit. No results found for this or any previous visit.              Patient Active Problem List   Diagnosis Code Recurrent acute otitis media H66.90    AR (allergic rhinitis) J30.9    Myasthenia gravis ([facility]) G70.00    Menometrorrhagia N92.1    Myasthenia gravis in remission ([facility] G70.00               The patient should return to clinic in 1 year    Renewed medication: None today    I spent    32 minutes on the day of the encounter preparing the office visit by reviewing medical records, obtaining a history, performing examination, counseling and educating the patient  on diagnosis, documenting in the clinical medical record, and coordinating the care for the patient. The patient had the ability to ask questions and all questions were answered.         Signed By:  Jer Heard DO FAAN    September 30, 2022

## 2022-09-30 ENCOUNTER — OFFICE VISIT (OUTPATIENT)
Dept: NEUROLOGY | Age: 32
End: 2022-09-30
Payer: COMMERCIAL

## 2022-09-30 VITALS
BODY MASS INDEX: 27.82 KG/M2 | HEIGHT: 62 IN | DIASTOLIC BLOOD PRESSURE: 60 MMHG | RESPIRATION RATE: 16 BRPM | WEIGHT: 151.2 LBS | SYSTOLIC BLOOD PRESSURE: 100 MMHG

## 2022-09-30 DIAGNOSIS — G70.00 MYASTHENIA GRAVIS (HCC): Primary | ICD-10-CM

## 2022-09-30 DIAGNOSIS — R42 DIZZINESS: ICD-10-CM

## 2022-09-30 PROCEDURE — 99214 OFFICE O/P EST MOD 30 MIN: CPT | Performed by: PSYCHIATRY & NEUROLOGY

## 2022-09-30 NOTE — LETTER
9/30/2022    Patient: Marcella Frank   YOB: 1990   Date of Visit: 9/30/2022     [address]  [address] [facility] [address]    Dear Zi Schumacher MD,      Thank you for referring Ms. Mario Alberto Rollins to [address] [place] [address] for evaluation. My notes for this consultation are attached. If you have questions, please do not hesitate to call me. I look forward to following your patient along with you.       Sincerely,    Michael Guerrero, DO

## 2023-07-21 ENCOUNTER — TELEPHONE (OUTPATIENT)
Age: 33
End: 2023-07-21

## 2023-07-21 NOTE — TELEPHONE ENCOUNTER
Message  Received: Today  Yeimi Jordan, DO  [address] [facility]  Caller: Unspecified (Today, 10:44 AM)  If she is having respiratory symptoms/concerns, she needs to go seek an urgent respiratory evaluation at her closest ER. Called pt,verified pt with two pt identifiers, advised pt of difficulty breathing, fever, sob, chest discomfort then go to ER/UC. Advised if stable she can give the prednisone the rest of the day to work and see if she feels better by tomorrow. Advised that Jodee Mcfarland is on call the next week at [facility] pt suggest she will go there. Pt verbalized understanding.

## 2023-07-21 NOTE — TELEPHONE ENCOUNTER
Pt not sure if she should go to ER, not sure if what she is feeling is emergency, but has been in remission for a while and states she \" doesn't feel right\". Placed pt on waitlist, advised would make nurse aware and request call back. Please call.

## 2023-07-21 NOTE — TELEPHONE ENCOUNTER
Called pt,verified pt with two pt identifiers, pt advised she went to [facility] on Wednesday and was given prednisone dose colleen -as she was Dx with sinus infection. She started the medication yesterday so she is on day 2. Last night she just didn't feel right, like she couldn't get a deep breath in. She is not congested, no fever, no cough. No sob while talking to me on phone or sitting still or while up and moving. She advised that when the dr listened to her at [facility] she noted no wheezing. She notes her chest/heart feels like it hurts. She notes just feeling like she cannot get that deep breath in. She advised she has been in MG remission for over 10 yrs and is on no medication for it. She advised when she starts noticing she cannot get that deep breath in, she starts to get anxious. I advised that is definitely making it worse. I advised for pt to sit down, relax, breathe in thru her nose and out thru her mouth. I advised I can send this to Elyssa Espinosa and let him know what is going on and call her back. Advised if her symptoms get worse or different then she should go back to UC or ER. Pt verbalized understanding.

## 2023-07-23 ENCOUNTER — APPOINTMENT (OUTPATIENT)
Facility: HOSPITAL | Age: 33
End: 2023-07-23
Payer: COMMERCIAL

## 2023-07-23 ENCOUNTER — HOSPITAL ENCOUNTER (EMERGENCY)
Facility: HOSPITAL | Age: 33
Discharge: HOME | End: 2023-07-23
Attending: EMERGENCY MEDICINE
Payer: COMMERCIAL

## 2023-07-23 VITALS
TEMPERATURE: 98.4 F | HEART RATE: 105 BPM | DIASTOLIC BLOOD PRESSURE: 95 MMHG | BODY MASS INDEX: 28.44 KG/M2 | SYSTOLIC BLOOD PRESSURE: 132 MMHG | RESPIRATION RATE: 18 BRPM | HEIGHT: 62 IN | WEIGHT: 154.54 LBS | OXYGEN SATURATION: 98 %

## 2023-07-23 DIAGNOSIS — R07.9 ACUTE CHEST PAIN: Primary | ICD-10-CM

## 2023-07-23 DIAGNOSIS — R06.02 SHORTNESS OF BREATH: ICD-10-CM

## 2023-07-23 LAB
ALBUMIN SERPL-MCNC: 3.7 G/DL (ref 3.5–5)
ALBUMIN/GLOB SERPL: 1.1 (ref 1.1–2.2)
ALP SERPL-CCNC: 91 U/L (ref 45–117)
ALT SERPL-CCNC: 31 U/L (ref 12–78)
ANION GAP SERPL CALC-SCNC: 8 MMOL/L (ref 5–15)
AST SERPL-CCNC: 20 U/L (ref 15–37)
BASOPHILS # BLD: 0.1 K/UL (ref 0–0.1)
BASOPHILS NFR BLD: 1 % (ref 0–1)
BILIRUB SERPL-MCNC: 0.2 MG/DL (ref 0.2–1)
BUN SERPL-MCNC: 8 MG/DL (ref 6–20)
BUN/CREAT SERPL: 9 (ref 12–20)
CALCIUM SERPL-MCNC: 9.4 MG/DL (ref 8.5–10.1)
CHLORIDE SERPL-SCNC: 106 MMOL/L (ref 97–108)
CO2 SERPL-SCNC: 25 MMOL/L (ref 21–32)
CREAT SERPL-MCNC: 0.91 MG/DL (ref 0.55–1.02)
D DIMER PPP FEU-MCNC: 0.19 MG/L FEU (ref 0–0.65)
DIFFERENTIAL METHOD BLD: ABNORMAL
EOSINOPHIL # BLD: 0.1 K/UL (ref 0–0.4)
EOSINOPHIL NFR BLD: 1 % (ref 0–7)
ERYTHROCYTE [DISTWIDTH] IN BLOOD BY AUTOMATED COUNT: 13 % (ref 11.5–14.5)
GLOBULIN SER CALC-MCNC: 3.5 G/DL (ref 2–4)
GLUCOSE SERPL-MCNC: 117 MG/DL (ref 65–100)
HCT VFR BLD AUTO: 41.4 % (ref 35–47)
HGB BLD-MCNC: 14.1 G/DL (ref 11.5–16)
IMM GRANULOCYTES # BLD AUTO: 0.1 K/UL (ref 0–0.04)
IMM GRANULOCYTES NFR BLD AUTO: 0 % (ref 0–0.5)
LYMPHOCYTES # BLD: 1.7 K/UL (ref 0.8–3.5)
LYMPHOCYTES NFR BLD: 15 % (ref 12–49)
MAGNESIUM SERPL-MCNC: 2 MG/DL (ref 1.6–2.4)
MCH RBC QN AUTO: 31.5 PG (ref 26–34)
MCHC RBC AUTO-ENTMCNC: 34.1 G/DL (ref 30–36.5)
MCV RBC AUTO: 92.4 FL (ref 80–99)
MONOCYTES # BLD: 0.9 K/UL (ref 0–1)
MONOCYTES NFR BLD: 8 % (ref 5–13)
NEUTS SEG # BLD: 8.4 K/UL (ref 1.8–8)
NEUTS SEG NFR BLD: 75 % (ref 32–75)
NRBC # BLD: 0 K/UL (ref 0–0.01)
NRBC BLD-RTO: 0 PER 100 WBC
PLATELET # BLD AUTO: 354 K/UL (ref 150–400)
PMV BLD AUTO: 9.3 FL (ref 8.9–12.9)
POTASSIUM SERPL-SCNC: 4 MMOL/L (ref 3.5–5.1)
PROT SERPL-MCNC: 7.2 G/DL (ref 6.4–8.2)
RBC # BLD AUTO: 4.48 M/UL (ref 3.8–5.2)
SODIUM SERPL-SCNC: 139 MMOL/L (ref 136–145)
TROPONIN I SERPL HS-MCNC: 4 NG/L (ref 0–51)
TROPONIN I SERPL HS-MCNC: <4 NG/L (ref 0–51)
WBC # BLD AUTO: 11.2 K/UL (ref 3.6–11)

## 2023-07-23 PROCEDURE — 6360000002 HC RX W HCPCS: Performed by: EMERGENCY MEDICINE

## 2023-07-23 PROCEDURE — 93005 ELECTROCARDIOGRAM TRACING: CPT | Performed by: EMERGENCY MEDICINE

## 2023-07-23 PROCEDURE — 96374 THER/PROPH/DIAG INJ IV PUSH: CPT

## 2023-07-23 PROCEDURE — 85025 COMPLETE CBC W/AUTO DIFF WBC: CPT

## 2023-07-23 PROCEDURE — 83735 ASSAY OF MAGNESIUM: CPT

## 2023-07-23 PROCEDURE — 85379 FIBRIN DEGRADATION QUANT: CPT

## 2023-07-23 PROCEDURE — 71045 X-RAY EXAM CHEST 1 VIEW: CPT

## 2023-07-23 PROCEDURE — 99285 EMERGENCY DEPT VISIT HI MDM: CPT

## 2023-07-23 PROCEDURE — 36415 COLL VENOUS BLD VENIPUNCTURE: CPT

## 2023-07-23 PROCEDURE — 84484 ASSAY OF TROPONIN QUANT: CPT

## 2023-07-23 PROCEDURE — 80053 COMPREHEN METABOLIC PANEL: CPT

## 2023-07-23 RX ORDER — KETOROLAC TROMETHAMINE 10 MG/1
10 TABLET, FILM COATED ORAL EVERY 6 HOURS PRN
Qty: 10 TABLET | Refills: 0 | Status: SHIPPED | OUTPATIENT
Start: 2023-07-23

## 2023-07-23 RX ORDER — KETOROLAC TROMETHAMINE 30 MG/ML
30 INJECTION, SOLUTION INTRAMUSCULAR; INTRAVENOUS
Status: COMPLETED | OUTPATIENT
Start: 2023-07-23 | End: 2023-07-23

## 2023-07-23 RX ORDER — METHOCARBAMOL 750 MG/1
750 TABLET, FILM COATED ORAL 4 TIMES DAILY PRN
Qty: 20 TABLET | Refills: 0 | Status: SHIPPED | OUTPATIENT
Start: 2023-07-23 | End: 2023-08-02

## 2023-07-23 RX ADMIN — KETOROLAC TROMETHAMINE 30 MG: 30 INJECTION, SOLUTION INTRAMUSCULAR; INTRAVENOUS at 18:18

## 2023-07-23 ASSESSMENT — PAIN - FUNCTIONAL ASSESSMENT
PAIN_FUNCTIONAL_ASSESSMENT: ACTIVITIES ARE NOT PREVENTED
PAIN_FUNCTIONAL_ASSESSMENT: 0-10

## 2023-07-23 ASSESSMENT — PAIN SCALES - GENERAL
PAINLEVEL_OUTOF10: 8
PAINLEVEL_OUTOF10: 8

## 2023-07-23 ASSESSMENT — PAIN DESCRIPTION - DESCRIPTORS: DESCRIPTORS: DISCOMFORT

## 2023-07-23 ASSESSMENT — HEART SCORE: ECG: 0

## 2023-07-23 ASSESSMENT — PAIN DESCRIPTION - ORIENTATION: ORIENTATION: LEFT

## 2023-07-23 ASSESSMENT — PAIN DESCRIPTION - LOCATION: LOCATION: CHEST

## 2023-07-24 LAB
EKG ATRIAL RATE: 88 BPM
EKG DIAGNOSIS: NORMAL
EKG P AXIS: 13 DEGREES
EKG P-R INTERVAL: 130 MS
EKG Q-T INTERVAL: 354 MS
EKG QRS DURATION: 88 MS
EKG QTC CALCULATION (BAZETT): 428 MS
EKG R AXIS: 36 DEGREES
EKG T AXIS: 8 DEGREES
EKG VENTRICULAR RATE: 88 BPM

## 2023-07-24 NOTE — ED PROVIDER NOTES
reviewed with her. She has been counseled regarding her diagnosis, treatment, and plan. She verbally conveys understanding and agreement of the signs, symptoms, diagnosis, treatment and prognosis and additionally agrees to follow up as discussed. She also agrees with the care-plan and conveys that all of her questions have been answered. I have also provided discharge instructions for her that include: educational information regarding their diagnosis and treatment, and list of reasons why they would want to return to the ED prior to their follow-up appointment, should her condition change. CLINICAL IMPRESSION    Discharge Note: The patient is stable for discharge home. The signs, symptoms, diagnosis, and discharge instructions have been discussed, understanding conveyed, and agreed upon. The patient is to follow up as recommended or return to ER should their symptoms worsen.       PATIENT REFERRED TO:  Flor Kumar MD  [address] Yusuf Alves [address] [place] [address]  [phone]      As needed    [facility] EM[facility]  [address] [place] [address] [address] [phone]    If symptoms worsen       DISCHARGE MEDICATIONS:     Medication List        START taking these medications      ketorolac 10 MG tablet  Commonly known as: TORADOL  Take 1 tablet by mouth every 6 hours as needed for Pain     methocarbamol 750 MG tablet  Commonly known as: Robaxin-750  Take 1 tablet by mouth 4 times daily as needed (spasm)            ASK your doctor about these medications      ALPRAZolam 0.5 MG tablet  Commonly known as: Brock Greek     norethindrone-ethinyl estradiol-iron 1.5-30 MG-MCG tablet  Commonly known as: Jadine Eileen FE1.5/30               Where to Get Your Medications        These medications were sent to [facility]/pharmacy #[id number]- [address] [facility], [address] [place] [address] [phone] Jenni Mcmillan [phone]  [address] [place] [address], [address] [place] [address] Drive      Hours: 24-hours Phone: [phone]

## 2023-07-24 NOTE — ED NOTES
Patient stable GCS15  Iv cannula removed   Discharged summary explained and understood      Smith Restrepo RN  07/23/23 2027

## 2024-01-26 ENCOUNTER — OFFICE VISIT (OUTPATIENT)
Age: 34
End: 2024-01-26
Payer: COMMERCIAL

## 2024-01-26 VITALS
HEART RATE: 58 BPM | HEIGHT: 62 IN | WEIGHT: 158 LBS | RESPIRATION RATE: 18 BRPM | TEMPERATURE: 98.1 F | DIASTOLIC BLOOD PRESSURE: 60 MMHG | OXYGEN SATURATION: 100 % | BODY MASS INDEX: 29.08 KG/M2 | SYSTOLIC BLOOD PRESSURE: 130 MMHG

## 2024-01-26 DIAGNOSIS — G70.00 MYASTHENIA GRAVIS WITHOUT (ACUTE) EXACERBATION (HCC): Primary | ICD-10-CM

## 2024-01-26 DIAGNOSIS — R42 DIZZINESS AND GIDDINESS: ICD-10-CM

## 2024-01-26 PROCEDURE — 99215 OFFICE O/P EST HI 40 MIN: CPT

## 2024-01-26 RX ORDER — ESCITALOPRAM OXALATE 10 MG/1
10 TABLET ORAL DAILY
COMMUNITY

## 2024-01-26 ASSESSMENT — ENCOUNTER SYMPTOMS
RESPIRATORY NEGATIVE: 1
ALLERGIC/IMMUNOLOGIC NEGATIVE: 1
EYES NEGATIVE: 1
GASTROINTESTINAL NEGATIVE: 1

## 2024-01-26 ASSESSMENT — PATIENT HEALTH QUESTIONNAIRE - PHQ9
1. LITTLE INTEREST OR PLEASURE IN DOING THINGS: 0
2. FEELING DOWN, DEPRESSED OR HOPELESS: 0
SUM OF ALL RESPONSES TO PHQ QUESTIONS 1-9: 0
SUM OF ALL RESPONSES TO PHQ9 QUESTIONS 1 & 2: 0
SUM OF ALL RESPONSES TO PHQ QUESTIONS 1-9: 0
SUM OF ALL RESPONSES TO PHQ QUESTIONS 1-9: 0

## 2024-01-26 NOTE — ASSESSMENT & PLAN NOTE
Stable    Patient denied any recurrence of myasthenia gravis symptoms.    She still managed to remain off of any immunosuppressive medications.    Will continue to monitor patient for this given once myasthenic, always a myasthenic.    Education on medications to avoid was discussed the patient.  She is to reach out to her pharmacist in neurology clinic for uncertainty.

## 2024-01-26 NOTE — PROGRESS NOTES
[facility] NEUROLOGY CLINIC  In Office FOLLOW-UP VISIT         Gia De Paz is a 33 y.o. female who presents today for the following:  Chief Complaint   Patient presents with    Follow-up     Pt states she is fine and has no further updates          ASSESSMENT AND PLAN  Patient is known to this practice.  This is my first time seeing the patient.  Chart and history reviewed in detail at today's office visit.    1. Myasthenia gravis without (acute) exacerbation (HCC)  Assessment & Plan:   Stable    Patient denied any recurrence of myasthenia gravis symptoms.    She still managed to remain off of any immunosuppressive medications.    Will continue to monitor patient for this given once myasthenic, always a myasthenic.    Education on medications to avoid was discussed the patient.  She is to reach out to her pharmacist in neurology clinic for uncertainty.      2. Dizziness and giddiness  Assessment & Plan:   Thought to be stress related    Symptoms have resolved since patient started on Lexapro 10 mg daily and Xanax 0.5 mg as needed.    She is to continue to take her medications as prescribed.  Her PCP has been managing her medications.      Patient and/or family was given time to ask questions and voice concerns. I believe all questions concerns were adequately addressed at this  office visit.  Patient and/or family also verbalized agreement and understanding of the above-stated plan    Complex neurologic decision making secondary any or all of the following to include unclear etiology, and /or polypharmacy, and/or significant comorbid conditions, and/or use of high-risk medications which complicate the decision making process related to patient's neurologic diagnosis         Diagnosis Orders   1. Myasthenia gravis without (acute) exacerbation (HCC)        2. Dizziness and giddiness              I attest that 40 minutes was spent on today's visit reviewing medical records and diagnostic testing

## 2024-01-26 NOTE — PATIENT INSTRUCTIONS
As per our discussion,    Overall, you are stable.    If you start experiencing symptoms related to myasthenia gravis, I advised to go to the ED for evaluation.    Please continue to follow-up with your primary care provider for anxiety/stress management.  Continue to take your Lexapro and Xanax as prescribed by your PCP.    It was a pleasure meeting you today    Will see you back in a year or sooner if needed.    Please do not hesitate to reach out for any questions or concerns.

## 2024-01-26 NOTE — ASSESSMENT & PLAN NOTE
Thought to be stress related    Symptoms have resolved since patient started on Lexapro 10 mg daily and Xanax 0.5 mg as needed.    She is to continue to take her medications as prescribed.  Her PCP has been managing her medications.

## 2024-07-09 ENCOUNTER — HOSPITAL ENCOUNTER (OUTPATIENT)
Facility: HOSPITAL | Age: 34
Discharge: HOME | End: 2024-07-12
Payer: COMMERCIAL

## 2024-07-09 VITALS — BODY MASS INDEX: 27.44 KG/M2 | WEIGHT: 150 LBS

## 2024-07-09 DIAGNOSIS — N63.24 MASS OF LOWER INNER QUADRANT OF LEFT BREAST: ICD-10-CM

## 2024-07-09 PROCEDURE — 76642 ULTRASOUND BREAST LIMITED: CPT

## 2024-07-09 PROCEDURE — G0279 TOMOSYNTHESIS, MAMMO: HCPCS

## 2025-01-09 ENCOUNTER — HOSPITAL ENCOUNTER (OUTPATIENT)
Facility: HOSPITAL | Age: 35
Discharge: HOME | End: 2025-01-12
Payer: COMMERCIAL

## 2025-01-09 ENCOUNTER — TRANSCRIBE ORDERS (OUTPATIENT)
Facility: HOSPITAL | Age: 35
End: 2025-01-09

## 2025-01-09 DIAGNOSIS — R92.8 FOLLOW-UP EXAMINATION OF ABNORMAL MAMMOGRAM: ICD-10-CM

## 2025-01-09 DIAGNOSIS — R92.8 ABNORMAL MAMMOGRAM OF LEFT BREAST: Primary | ICD-10-CM

## 2025-01-09 PROCEDURE — 76642 ULTRASOUND BREAST LIMITED: CPT
